# Patient Record
Sex: MALE
[De-identification: names, ages, dates, MRNs, and addresses within clinical notes are randomized per-mention and may not be internally consistent; named-entity substitution may affect disease eponyms.]

---

## 2020-10-19 ENCOUNTER — RX ONLY (RX ONLY)
Age: 30
End: 2020-10-19

## 2020-10-19 ENCOUNTER — SEE NOTE (OUTPATIENT)
Dept: URBAN - METROPOLITAN AREA CLINIC 32 | Facility: CLINIC | Age: 30
Setting detail: DERMATOLOGY
End: 2020-10-19

## 2020-10-19 DIAGNOSIS — D48.5 NEOPLASM OF UNCERTAIN BEHAVIOR OF SKIN: ICD-10-CM

## 2020-10-19 PROBLEM — L73.8 OTHER SPECIFIED FOLLICULAR DISORDERS: Status: RESOLVED | Noted: 2020-10-19

## 2020-10-19 PROBLEM — L73.8 OTHER SPECIFIED FOLLICULAR DISORDERS: Status: ACTIVE | Noted: 2020-10-19

## 2020-10-19 PROBLEM — L90.5 SCAR CONDITIONS AND FIBROSIS OF SKIN: Status: ACTIVE | Noted: 2020-10-19

## 2020-10-19 PROBLEM — L90.5 SCAR CONDITIONS AND FIBROSIS OF SKIN: Status: RESOLVED | Noted: 2020-10-19

## 2020-10-19 PROCEDURE — 99202 OFFICE O/P NEW SF 15 MIN: CPT

## 2020-10-19 PROCEDURE — 11900 INJECT SKIN LESIONS </W 7: CPT

## 2020-10-19 RX ORDER — CLOBETASOL PROPIONATE 0.5 MG/G
1 APPLICATION GEL TOPICAL ADD'L SIG
Qty: 60 | Refills: 3
Start: 2020-10-19

## 2020-10-19 RX ORDER — SODIUM SULFACETAMIDE AND SULFUR 80; 40 MG/ML; MG/ML
1 APPLICATION LOTION TOPICAL DAILY
Qty: 473 | Refills: 11
Start: 2020-10-19

## 2020-10-19 RX ORDER — TRETINOIN 0.25 MG/G
1 APPLICATION CREAM TOPICAL QHS
Qty: 20 | Refills: 11
Start: 2020-10-19

## 2021-04-07 ENCOUNTER — RX ONLY (RX ONLY)
Age: 31
End: 2021-04-07

## 2021-04-07 RX ORDER — TRETINOIN 0.25 MG/G
1 A SMALL AMOUNT CREAM TOPICAL EVERY NIGHT
Qty: 45 | Refills: 1
Start: 2021-04-07

## 2022-02-02 ENCOUNTER — OFFICE VISIT (OUTPATIENT)
Dept: URBAN - METROPOLITAN AREA CLINIC 27 | Facility: CLINIC | Age: 32
End: 2022-02-02

## 2022-02-02 PROBLEM — 59621000 ESSENTIAL HYPERTENSION: Status: ACTIVE | Noted: 2022-02-02

## 2022-02-02 PROBLEM — 2901004 MELENA: Status: ACTIVE | Noted: 2022-02-02

## 2022-02-02 PROBLEM — 86406008 HUMAN IMMUNODEFICIENCY VIRUS INFECTION: Status: ACTIVE | Noted: 2022-02-02

## 2022-02-11 ENCOUNTER — OFFICE VISIT (OUTPATIENT)
Dept: URBAN - METROPOLITAN AREA SURGERY CENTER 7 | Facility: SURGERY CENTER | Age: 32
End: 2022-02-11

## 2022-04-30 ENCOUNTER — TELEPHONE ENCOUNTER (OUTPATIENT)
Dept: URBAN - METROPOLITAN AREA CLINIC 121 | Facility: CLINIC | Age: 32
End: 2022-04-30

## 2022-04-30 RX ORDER — DOXYCYCLINE HYCLATE 100 MG/1
TAKE 1 TABLET BY MOUTH TWICE A DAY TABLET ORAL
OUTPATIENT
Start: 2022-02-11 | End: 2022-02-25

## 2022-04-30 RX ORDER — MESALAMINE 1000 MG/1
INSERT 1 SUPPOSITORY RECTALLY AT BEDTIME SUPPOSITORY RECTAL
OUTPATIENT
Start: 2022-02-11 | End: 2022-03-13

## 2022-04-30 RX ORDER — VALACYCLOVIR HCL 1000 MG
TAKE 1 TABLET BY MOUTH ONCE A DAY TABLET ORAL
OUTPATIENT
Start: 2022-02-11 | End: 2022-03-13

## 2022-04-30 RX ORDER — AZITHROMYCIN DIHYDRATE 500 MG/1
TAKE 1 TABLET BY MOUTH ONCE A DAY TABLET, FILM COATED ORAL
OUTPATIENT
Start: 2022-02-11 | End: 2022-02-16

## 2022-05-01 ENCOUNTER — TELEPHONE ENCOUNTER (OUTPATIENT)
Dept: URBAN - METROPOLITAN AREA CLINIC 121 | Facility: CLINIC | Age: 32
End: 2022-05-01

## 2022-05-01 RX ORDER — POLYETHYLENE GLYCOL 3350, SODIUM SULFATE, SODIUM CHLORIDE, POTASSIUM CHLORIDE, ASCORBIC ACID, SODIUM ASCORBATE 140-9-5.2G
TAKE AS DIRECTED MIX AND USE AS DIRECTED KIT ORAL
Status: ACTIVE | COMMUNITY
Start: 2022-02-03

## 2022-05-01 RX ORDER — AMLODIPINE AND BENAZEPRIL HYDROCHLORIDE 5; 20 MG/1; MG/1
CAPSULE ORAL
Status: ACTIVE | COMMUNITY

## 2024-11-22 ENCOUNTER — OFFICE VISIT (OUTPATIENT)
Dept: URBAN - METROPOLITAN AREA CLINIC 35 | Facility: CLINIC | Age: 34
End: 2024-11-22
Payer: COMMERCIAL

## 2024-11-22 ENCOUNTER — DASHBOARD ENCOUNTERS (OUTPATIENT)
Age: 34
End: 2024-11-22

## 2024-11-22 VITALS
DIASTOLIC BLOOD PRESSURE: 82 MMHG | BODY MASS INDEX: 28.41 KG/M2 | HEIGHT: 67 IN | WEIGHT: 181 LBS | SYSTOLIC BLOOD PRESSURE: 130 MMHG

## 2024-11-22 DIAGNOSIS — K52.9 CHRONIC DIARRHEA: ICD-10-CM

## 2024-11-22 DIAGNOSIS — K58.0 IRRITABLE BOWEL SYNDROME WITH DIARRHEA: ICD-10-CM

## 2024-11-22 DIAGNOSIS — R19.4 CHANGE IN BOWEL HABITS: ICD-10-CM

## 2024-11-22 PROBLEM — 197125005: Status: ACTIVE | Noted: 2024-11-22

## 2024-11-22 PROCEDURE — 99204 OFFICE O/P NEW MOD 45 MIN: CPT | Performed by: PHYSICIAN ASSISTANT

## 2024-11-22 RX ORDER — AMLODIPINE AND BENAZEPRIL HYDROCHLORIDE 5; 20 MG/1; MG/1
CAPSULE ORAL
Status: ACTIVE | COMMUNITY

## 2024-11-22 RX ORDER — RIFAXIMIN 550 MG/1
1 TABLET TABLET ORAL THREE TIMES A DAY
Qty: 42 TABLET | Refills: 0 | OUTPATIENT
Start: 2024-11-22 | End: 2024-12-06

## 2024-11-22 RX ORDER — CHOLECALCIFEROL (VITAMIN D3) 50 MCG
1 TABLET TABLET ORAL ONCE A DAY
Status: ACTIVE | COMMUNITY

## 2024-11-22 NOTE — HPI-DIARRHEA
34 year old male patient presents today for diarrhea. Onset was a year ago. Patient currently admits 1-3 bowel movements per day, without strain. Stools are loose. He admits symptoms have somewhat improved lately. Denies any blood, mucus, or melena in stools.  He has more loose stools than normal.  He denies any nocturnal diarrhea.  He has lost about six pounds.  No abdominal pain, no nausea. He denies post prandial diarrrhea. He feels his stomach is "unsettled". He had food allergy testing done recently that said he should avoid gluten.  Pt had a colonoscopy in [place], can't recall GI name, and he had proctitis and was treated and symptoms resolved with abx.

## 2024-12-13 ENCOUNTER — OFFICE VISIT (OUTPATIENT)
Dept: URBAN - METROPOLITAN AREA CLINIC 35 | Facility: CLINIC | Age: 34
End: 2024-12-13

## 2025-04-18 ENCOUNTER — OFFICE VISIT (OUTPATIENT)
Dept: URBAN - METROPOLITAN AREA CLINIC 84 | Facility: CLINIC | Age: 35
End: 2025-04-18